# Patient Record
(demographics unavailable — no encounter records)

---

## 2025-07-22 NOTE — PLAN
[FreeTextEntry1] : 57 year old presents for annual visit.   HCM -pap today -colonoscopy UTD -dexa UTD -breast mammo UTD -sono today shows em lining 1.4mm, normal ovaries -pt to monitor for vaginal bleeding, if pt has another episode will consider further evaluation  RTO 1 year

## 2025-07-22 NOTE — HISTORY OF PRESENT ILLNESS
[FreeTextEntry1] : 57 year old presents for annual visit. Pt reports 1 episode of spotting after lifting something heavy. Pt has a h/o a cyst on her urethra.   OBHx:  at [facility]: septate uterus-repaired scope, LEEP with subsequent normal PAPs PSH: septum removed from uterus, breast biopsy, LEEP FHx: DM (mother), CVD (father), stomach cancer (father), renal cancer (sister)  Allergies: NKDA [Mammogramdate] : 2025 [PapSmeardate] : 07/24 [BoneDensityDate] : 2025 [ColonoscopyDate] : 2023

## 2025-07-22 NOTE — END OF VISIT
[FreeTextEntry3] : I, Teri Diaz, acted as a scribe on behalf of Dr. Margot Hensley M.D. on 07/22/2025.   All medical entries made by the scribe were at my Dr. Margot Hensley M.D. direction and personally dictated by me on 07/22/2025. I have reviewed the chart and agree that the record accurately reflects my personal performance of the history, physical exam, assessment and plan. I have also personally directed, reviewed, and agreed with the chart.